# Patient Record
Sex: MALE | Race: BLACK OR AFRICAN AMERICAN | NOT HISPANIC OR LATINO | Employment: FULL TIME | URBAN - METROPOLITAN AREA
[De-identification: names, ages, dates, MRNs, and addresses within clinical notes are randomized per-mention and may not be internally consistent; named-entity substitution may affect disease eponyms.]

---

## 2017-08-21 ENCOUNTER — CLINICAL SUPPORT (OUTPATIENT)
Dept: OCCUPATIONAL MEDICINE | Facility: CLINIC | Age: 22
End: 2017-08-21

## 2017-08-21 DIAGNOSIS — Z02.1 ENCOUNTER FOR PRE-EMPLOYMENT EXAMINATION: Primary | ICD-10-CM

## 2017-08-21 LAB
BILIRUB UR QL STRIP: NORMAL
GLUCOSE UR QL STRIP: NEGATIVE
KETONES UR QL STRIP: NORMAL
LEUKOCYTE ESTERASE UR QL STRIP: NORMAL
PH, POC UA: NORMAL (ref 5–8)
POC BLOOD, URINE: NEGATIVE
POC NITRATES, URINE: NORMAL
PROT UR QL STRIP: NEGATIVE
SP GR UR STRIP: 1 (ref 1–1.03)
UROBILINOGEN UR STRIP-ACNC: 2 (ref 0.3–2.2)

## 2017-08-21 PROCEDURE — 80305 DRUG TEST PRSMV DIR OPT OBS: CPT | Mod: S$GLB,,, | Performed by: PHYSICIAN ASSISTANT

## 2017-08-21 PROCEDURE — 99173 VISUAL ACUITY SCREEN: CPT | Mod: S$GLB,,, | Performed by: PHYSICIAN ASSISTANT

## 2017-08-21 PROCEDURE — 36415 COLL VENOUS BLD VENIPUNCTURE: CPT | Mod: S$GLB,,, | Performed by: PHYSICIAN ASSISTANT

## 2017-08-21 PROCEDURE — 99499 UNLISTED E&M SERVICE: CPT | Mod: S$GLB,,, | Performed by: PHYSICIAN ASSISTANT

## 2017-08-21 PROCEDURE — 99000 SPECIMEN HANDLING OFFICE-LAB: CPT | Mod: S$GLB,,, | Performed by: PHYSICIAN ASSISTANT

## 2017-08-21 PROCEDURE — 92283 EXTND COLOR VISION XM: CPT | Mod: S$GLB,,, | Performed by: PHYSICIAN ASSISTANT

## 2022-08-30 ENCOUNTER — TELEPHONE (OUTPATIENT)
Dept: ORTHOPEDICS | Facility: CLINIC | Age: 27
End: 2022-08-30
Payer: MEDICAID

## 2022-08-30 NOTE — TELEPHONE ENCOUNTER
----- Message from Niyah Lobato sent at 8/30/2022 11:11 AM CDT -----  Contact: [phone]  Who Called: PT  Regarding: check on referral, was sent by  outside doctor.   Would the patient rather a call back or a response via [facility]? Call back  Best Call Back Number:  [phone]  Additional Information: n/a

## 2023-08-17 NOTE — TELEPHONE ENCOUNTER
Chief Complaint   Patient presents with   • Sore Throat     Sore throat x 2 weeks, reports boyfriend recently developed similar symptoms. Covid test negative at home.        HISTORY OF PRESENT ILLNESS:  Tomasa Santos is a 23 year old who presents with mild to moderate semi-productive cough, sore throat, nasal congestion, mild rhinorrhea, and postnasal drainage for 14 day(s). Denies fever. Symptoms are worsening, especially at night.  Claritin taken since symptoms started. No known sick contact. Patient denies nausea, vomit, diarrhea, body aches, headaches, chills, taste changes, smell changes, chest pain, shortness of breath, wheezing, or difficulty swallowing.  Had negative home COVID test.    PAST MEDICAL HISTORY, PAST SURGICAL HISTORY, SOCIAL HISTORY, MEDICATIONS, AND ALLERGIES:  Reviewed with patient.    History reviewed. No pertinent past medical history.  History reviewed. No pertinent surgical history.  Social History     Tobacco Use   • Smoking status: Never   • Smokeless tobacco: Never     Current Outpatient Medications   Medication Sig Dispense Refill   • cefdinir (OMNICEF) 300 MG capsule Take 1 capsule by mouth in the morning and 1 capsule in the evening. Do all this for 10 days. 20 capsule 0   • promethazine-dextromethorphan (PROMETHAZINE-DM) 6.25-15 MG/5ML syrup Take 5 mLs by mouth 4 times daily as needed for Cough. 120 mL 0     No current facility-administered medications for this visit.     ALLERGIES:   Allergen Reactions   • Isopropyl Alcohol, Rubbing RASH     History reviewed. No pertinent family history.    REVIEW OF SYSTEMS:  Constitutional: Denies fever. Denies chills. Denies body aches. Denies fatigue. Denies weakness.  HEENT: Denies visual acuity changes. Denies eye drainage. Denies eye redness. Denies eye pain. Denies ear pain. Positive nasal congestion. Positive rhinorrhea. Positive sore throat.  Denies sinus pain.  Positive postnasal drip. Denies taste changes. Denies smell changes.  Spoke with patient and informed patient that referral was not received. Fax number was given so referral can be sent over. All questions answered and patient verbalized understanding.      Respiratory: Positive cough. Denies wheezing. Denies shortness of breath.  Cardiovascular: Denies chest pain or palpitations.   Gastrointestinal: Denies abdominal pain. Denies nausea. Denies vomiting. Denies diarrhea.  Musculoskeletal: Denies back pain. Denies joint pain.   Neurologic: Denies headache. Denies motor changes. Denies sensory changes. Denies dizziness.   Skin: Denies rash. Denies itching.     PHYSICAL EXAM:  Vitals:   Vitals:    08/16/23 1908   BP: 127/57   Pulse: 70   Resp: 18   Temp: 97.6 °F (36.4 °C)     Constitutional: No acute distress. Nontoxic appearance. Patient is interactive and pleasant. Patient appears well-hydrated.  Skin: Warm. Normal skin tone. Non-diaphoretic. Normal skin turgor. No rash.  HENT: Normocephalic. Atraumatic. Bilateral external ears normal. Oropharynx moist. The throat appears minimally erythematous but without swelling. The nostrils/nasal passages are minimally inflamed. Mucous membranes are without swollen turbinates. Left tympanic membrane is clear and without erythema. Right tympanic membrane is clear and without erythema. No sinus tenderness to palpation. No sinus region swelling noted.  Eyes: Pupils are equal, round and reactive to light and accommodation. Extraocular muscles are intact. Conjunctivae normal. No discharge.  Neck: Normal range of motion. No tenderness. Supple.   Cardiovascular: Normal heart rate. Regular rhythm. No murmurs.    Pulmonary/Chest: No coarse breath sounds to auscultation bilaterally. Symmetrical chest expansion. No wheezing. No rhonchi. No crackles. No diminished breath sounds.  Occasional mild cough.  Abdomen: Bowel sounds normal. Soft and nondistended. No tenderness.   Back: Full range of motion.   Lymphatics: No lymphadenopathy.  Neurologic Exam: Alert and active x3.  No focal deficits.   Psychiatric: Cooperative, appropriate mood and affect.    LABS/RADIOLOGY:  Orders Placed This Encounter   • POCT Rapid strep A   • STREPTOCOCCUS GROUP  A (STREPTOCOCCUS PYOGENES), BACTERIAL CULTURE   • cefdinir (OMNICEF) 300 MG capsule   • promethazine-dextromethorphan (PROMETHAZINE-DM) 6.25-15 MG/5ML syrup       ASSESSMENT:  Encounter Diagnoses   Name Primary?   • Sore throat Yes   • Acute pharyngitis, unspecified etiology    • Acute URI        PLAN:  Rapid strep test was negative.  Strep culture pending.  MD suggested patient Omnicef and promethazine DM.  May continue to use your Claritin.  New toothbrush. Vitamin C, Vitamin D and zinc as needed. Tylenol as needed and as directed. Increase hydration and rest. Humidifier at night.  Patient is stable and capable discharge home.  Follow-up with primary care physician in one week.    Follow up with primary if no improvement of symptoms or, if symptoms worsen, please be evaluated at the Emergency Room.     Patient acknowledged understanding of the instructions.

## 2023-11-08 ENCOUNTER — HOSPITAL ENCOUNTER (EMERGENCY)
Facility: HOSPITAL | Age: 28
Discharge: HOME | End: 2023-11-08
Attending: EMERGENCY MEDICINE
Payer: MEDICAID

## 2023-11-08 VITALS
TEMPERATURE: 99 F | SYSTOLIC BLOOD PRESSURE: 136 MMHG | OXYGEN SATURATION: 98 % | RESPIRATION RATE: 10 BRPM | HEART RATE: 82 BPM | DIASTOLIC BLOOD PRESSURE: 84 MMHG

## 2023-11-08 DIAGNOSIS — T40.2X1A OPIOID OVERDOSE, ACCIDENTAL OR UNINTENTIONAL, INITIAL ENCOUNTER: Primary | ICD-10-CM

## 2023-11-08 LAB — POCT GLUCOSE: 81 MG/DL (ref 70–110)

## 2023-11-08 PROCEDURE — 99283 EMERGENCY DEPT VISIT LOW MDM: CPT

## 2023-11-08 PROCEDURE — 82962 GLUCOSE BLOOD TEST: CPT

## 2023-11-08 RX ORDER — NALOXONE HYDROCHLORIDE 1 MG/ML
INJECTION INTRAMUSCULAR; INTRAVENOUS; SUBCUTANEOUS
Qty: 2 ML | Refills: 11 | Status: SHIPPED | OUTPATIENT
Start: 2023-11-08

## 2023-11-09 NOTE — ED PROVIDER NOTES
"Encounter Date: 11/8/2023       History     Chief Complaint   Patient presents with    Altered Mental Status     Pt. Found unresponsive on floor. Ems reports pt. Had a + response to Narcan 8mg [place] per police. Upon their arrival pt. Remains A/O x4 and denies drug use. Refused iv en route.      Patient is a 28-year-old male who presents to the ED via EMS with complaint of altered mental status.  Per EMS, patient was found on the ground unresponsive with reported pinpoint pupils and agonal respirations.  Per EMS, he was administered 8 mg of intranasal Narcan by the fire department with reversal suspected opioid overdose.  Per EMS, vital signs were within normal limits on their arrival, AAOx3 following administration of naloxone.  POCT glucose was approx 200. Patient refused IV and was subsequently transported to the ED for evaluation.    Per patient, he states that today he went to get his teeth cleaned and when he got home he took "a few Percocet" and the next thing he states he remembers was waking up on a stretcher.  He denies any use of any other drugs other than the aforementioned oxycodone.  He denies any attempt to harm himself. He denies any physical complaints at this time.       Review of patient's allergies indicates:  No Known Allergies  Past Medical History:   Diagnosis Date    Allergy     AR    Varicella      No past surgical history on file.  Family History   Problem Relation Age of Onset    Diabetes Maternal Grandmother     Hypertension Maternal Grandmother      Social History     Tobacco Use    Smoking status: Every Day     Current packs/day: 0.50     Types: Cigarettes    Smokeless tobacco: Never   Substance Use Topics    Alcohol use: Yes     Comment: socially    Drug use: No     Review of Systems   Constitutional:  Negative for chills and fever.   Eyes:  Negative for photophobia and pain.   Respiratory:  Negative for cough and shortness of breath.    Cardiovascular:  Negative for chest pain, " palpitations and leg swelling.   Gastrointestinal:  Negative for abdominal pain, nausea and vomiting.   Genitourinary:  Negative for flank pain.   Musculoskeletal:  Negative for back pain and neck pain.   Neurological:  Negative for dizziness, seizures, syncope, light-headedness and headaches.   Psychiatric/Behavioral:  Negative for agitation, confusion, self-injury, sleep disturbance and suicidal ideas. The patient is not nervous/anxious and is not hyperactive.        Physical Exam     Initial Vitals [11/08/23 1843]   BP Pulse Resp Temp SpO2   138/88 104 18 98.6 °F (37 °C) 100 %      MAP       --         Physical Exam    Nursing note and vitals reviewed.  Constitutional: He appears well-developed and well-nourished. He is not diaphoretic. No distress.   Well-appearing   Non toxic  No acute distress noted    HENT:   Head: Normocephalic and atraumatic.   Right Ear: External ear normal.   Left Ear: External ear normal.   No overt signs of head trauma appreciated.    Eyes: Conjunctivae and EOM are normal. Pupils are equal, round, and reactive to light.   Pupils 3mm and reactive to light bilaterally.    Neck: Neck supple.   No midline tenderness  Normal ROM    Normal range of motion.  Cardiovascular:  Normal rate, regular rhythm, normal heart sounds and intact distal pulses.           Pulmonary/Chest: Breath sounds normal.   Lungs clear to auscultation   Abdominal: Abdomen is soft. Bowel sounds are normal.   Musculoskeletal:      Cervical back: Normal range of motion and neck supple.     Neurological: He is alert and oriented to person, place, and time. He has normal strength.   No focal neurological deficit  Strength 5/5  Sensation grossly in tact  MAEW  AAOx3  GCS 15    Skin: Skin is warm. Capillary refill takes less than 2 seconds.         ED Course   Procedures  Labs Reviewed   POCT GLUCOSE          Imaging Results    None          Medications - No data to display  Medical Decision Making  See HPI     Amount and/or  Complexity of Data Reviewed  Labs:  Decision-making details documented in ED Course.    Risk  Prescription drug management.               ED Course as of 11/09/23 1629   Wed Nov 08, 2023 2008 Patient observed in the ED for approximately 90 minutes without untoward event.  He is O2 saturation was greater than 92% at all times, he was not bradycardic or tachycardic he is able to ambulate ED without assistance.  No subsequent episodes of unresponsiveness or any complaints through his ED stay.  Will discharge patient home with prescription for Narcan. [LC]   2047 POCT Glucose: 81  Reviewed by self - WNL  [LC]      ED Course User Index  [LC] Tesfaye Park MD               Medical Decision Making:   Initial Assessment:   See HPI   Differential Diagnosis:   Opiate overdose, hepatic encephalopathy, alcohol intoxication, malingering, THC toxicity   ED Management:  - pt observed in ED for approximately 2 hours without untoward event  - pt was ambulatory without assistance, O2 sat >92% (room air), RR >10bpm, HR >50, Normal temp, GCS 15  - pt counseled on use of opioids, drug abuse  - pt discharged to home with rx for  IN naloxone  - No further intervention is indicated at this time after having taken into account the patient's history, physical exam findings, and empirical and objective data obtained during the patient's emergency department workup.   - The patient is at low risk for an emergent medical condition at this time, and I am of the belief that that it is safe to discharge the patient from the emergency department.   - The patient is instructed to follow up as outpatient as indicated on the discharge paperwork.    - I have discussed the specifics of the workup with the patient and the patient has verbalized understanding of the details of the workup, the diagnosis, the treatment plan, and the need for outpatient follow-up.    - Although the patient has no emergent etiology today this does not preclude the  development of an emergent condition so, in addition, I have advised the patient that they can return to the ED and/or activate EMS at any time with worsening of their symptoms, change of their symptoms, or with any other medical complaint.    - The patient remained comfortable and stable during their visit in the ED.    - Discharge and follow-up instructions discussed with the patient who expressed understanding and willingness to comply with my recommendations.  - Results of all emergency department tests  discussed thoroughly with patient; all patient questions answered; pt in agreement with plan  - Pt instructed to follow up with PCP in 2-3 days for recheck of today's complaints  - Pt given strict emergency department return precautions for any new or worsening of symptoms  - Pt discharged from the emergency department in stable condition, in no acute distress         Clinical Impression:   Final diagnoses:  [T40.2X1A] Opioid overdose, accidental or unintentional, initial encounter (Primary)        ED Disposition Condition    Discharge Stable          ED Prescriptions       Medication Sig Dispense Start Date End Date Auth. Provider    naloxone (NARCAN) 1 mg/mL injection 2 mg (1 mg per nostril) by Nasal route as needed for opioid overdose; may repeat in 3 to 5 minutes if not effective. Call 911 2 mL 11/8/2023 -- Tesfaye Park MD          Follow-up Information       Follow up With Specialties Details Why Contact Info    Dragan Cyr MD Internal Medicine Schedule an appointment as soon as possible for a visit   [address]  [id number]  [facility] [address]  [phone]               Tesfaye Park MD  11/09/23 4449

## 2023-11-09 NOTE — ED NOTES
"Presents to ER via EMS after having a "unresponsive episode with agonal respirations" with +response to 8mg IN Narcan on scene. Currently AAOx4. Respirations are equal and nonlabored. Currently denies drug use. Call light is within reach. Safety is intact.   "